# Patient Record
Sex: FEMALE | Race: WHITE
[De-identification: names, ages, dates, MRNs, and addresses within clinical notes are randomized per-mention and may not be internally consistent; named-entity substitution may affect disease eponyms.]

---

## 2019-02-28 ENCOUNTER — HOSPITAL ENCOUNTER (EMERGENCY)
Dept: HOSPITAL 96 - M.ERS | Age: 19
Discharge: HOME | End: 2019-02-28
Payer: COMMERCIAL

## 2019-02-28 VITALS — SYSTOLIC BLOOD PRESSURE: 111 MMHG | DIASTOLIC BLOOD PRESSURE: 66 MMHG

## 2019-02-28 VITALS — WEIGHT: 142.99 LBS | BODY MASS INDEX: 26.31 KG/M2 | HEIGHT: 62 IN

## 2019-02-28 DIAGNOSIS — J02.0: Primary | ICD-10-CM

## 2020-01-29 ENCOUNTER — HOSPITAL ENCOUNTER (INPATIENT)
Dept: HOSPITAL 96 - M.ERS | Age: 20
LOS: 1 days | Discharge: HOME | DRG: 897 | End: 2020-01-30
Attending: INTERNAL MEDICINE | Admitting: INTERNAL MEDICINE
Payer: COMMERCIAL

## 2020-01-29 VITALS — SYSTOLIC BLOOD PRESSURE: 86 MMHG | DIASTOLIC BLOOD PRESSURE: 35 MMHG

## 2020-01-29 VITALS — SYSTOLIC BLOOD PRESSURE: 101 MMHG | DIASTOLIC BLOOD PRESSURE: 61 MMHG

## 2020-01-29 VITALS — SYSTOLIC BLOOD PRESSURE: 104 MMHG | DIASTOLIC BLOOD PRESSURE: 63 MMHG

## 2020-01-29 VITALS — SYSTOLIC BLOOD PRESSURE: 83 MMHG | DIASTOLIC BLOOD PRESSURE: 30 MMHG

## 2020-01-29 VITALS — DIASTOLIC BLOOD PRESSURE: 42 MMHG | SYSTOLIC BLOOD PRESSURE: 92 MMHG

## 2020-01-29 VITALS — SYSTOLIC BLOOD PRESSURE: 102 MMHG | DIASTOLIC BLOOD PRESSURE: 33 MMHG

## 2020-01-29 VITALS — HEIGHT: 62 IN | BODY MASS INDEX: 23.37 KG/M2 | WEIGHT: 127 LBS

## 2020-01-29 VITALS — SYSTOLIC BLOOD PRESSURE: 101 MMHG | DIASTOLIC BLOOD PRESSURE: 75 MMHG

## 2020-01-29 VITALS — DIASTOLIC BLOOD PRESSURE: 54 MMHG | SYSTOLIC BLOOD PRESSURE: 90 MMHG

## 2020-01-29 VITALS — DIASTOLIC BLOOD PRESSURE: 45 MMHG | SYSTOLIC BLOOD PRESSURE: 96 MMHG

## 2020-01-29 VITALS — DIASTOLIC BLOOD PRESSURE: 80 MMHG | SYSTOLIC BLOOD PRESSURE: 133 MMHG

## 2020-01-29 VITALS — SYSTOLIC BLOOD PRESSURE: 97 MMHG | DIASTOLIC BLOOD PRESSURE: 50 MMHG

## 2020-01-29 VITALS — DIASTOLIC BLOOD PRESSURE: 36 MMHG | SYSTOLIC BLOOD PRESSURE: 86 MMHG

## 2020-01-29 VITALS — SYSTOLIC BLOOD PRESSURE: 95 MMHG | DIASTOLIC BLOOD PRESSURE: 48 MMHG

## 2020-01-29 VITALS — DIASTOLIC BLOOD PRESSURE: 46 MMHG | SYSTOLIC BLOOD PRESSURE: 107 MMHG

## 2020-01-29 VITALS — SYSTOLIC BLOOD PRESSURE: 107 MMHG | DIASTOLIC BLOOD PRESSURE: 64 MMHG

## 2020-01-29 VITALS — DIASTOLIC BLOOD PRESSURE: 56 MMHG | SYSTOLIC BLOOD PRESSURE: 83 MMHG

## 2020-01-29 DIAGNOSIS — F19.10: ICD-10-CM

## 2020-01-29 DIAGNOSIS — F10.230: Primary | ICD-10-CM

## 2020-01-29 DIAGNOSIS — Z79.899: ICD-10-CM

## 2020-01-29 DIAGNOSIS — Z28.21: ICD-10-CM

## 2020-01-29 DIAGNOSIS — F17.210: ICD-10-CM

## 2020-01-29 LAB
ABSOLUTE BASOPHILS: 0 THOU/UL (ref 0–0.2)
ABSOLUTE EOSINOPHILS: 0 THOU/UL (ref 0–0.7)
ABSOLUTE MONOCYTES: 0.5 THOU/UL (ref 0–1.2)
ALBUMIN SERPL-MCNC: 3.7 G/DL (ref 3.4–5)
ALP SERPL-CCNC: 85 U/L (ref 46–116)
ALT SERPL-CCNC: 34 U/L (ref 30–65)
ANION GAP SERPL CALC-SCNC: 10 MMOL/L (ref 7–16)
APAP SERPL-MCNC: < 2 UG/ML (ref 10–30)
AST SERPL-CCNC: 17 U/L (ref 15–37)
BASOPHILS NFR BLD AUTO: 0.3 %
BE: -4.8 MMOL/L
BILIRUB SERPL-MCNC: 0.4 MG/DL
BUN SERPL-MCNC: 6 MG/DL (ref 7–18)
CALCIUM SERPL-MCNC: 7.5 MG/DL (ref 8.5–10.1)
CHLORIDE SERPL-SCNC: 111 MMOL/L (ref 98–107)
CO2 SERPL-SCNC: 24 MMOL/L (ref 21–32)
CREAT SERPL-MCNC: 0.8 MG/DL (ref 0.6–1.3)
EOSINOPHIL NFR BLD: 0.4 %
ETHANOL SERPL-MCNC: 156 MG/DL (ref ?–10)
GLUCOSE SERPL-MCNC: 110 MG/DL (ref 70–99)
GRANULOCYTES NFR BLD MANUAL: 57.4 %
HCT VFR BLD CALC: 35.6 % (ref 37–47)
HGB BLD-MCNC: 12.2 GM/DL (ref 12–15)
LYMPHOCYTES # BLD: 2.5 THOU/UL (ref 0.8–5.3)
LYMPHOCYTES NFR BLD AUTO: 35.1 %
MCH RBC QN AUTO: 32.3 PG (ref 26–34)
MCHC RBC AUTO-ENTMCNC: 34.1 G/DL (ref 28–37)
MCV RBC: 94.7 FL (ref 80–100)
MONOCYTES NFR BLD: 6.8 %
MPV: 6.6 FL. (ref 7.2–11.1)
NEUTROPHILS # BLD: 4.1 THOU/UL (ref 1.6–8.1)
NUCLEATED RBCS: 0 /100WBC
PCO2 BLD: 36.7 MMHG (ref 35–45)
PLATELET COUNT*: 369 THOU/UL (ref 150–400)
PO2 BLD: 108.4 MMHG (ref 75–100)
POTASSIUM SERPL-SCNC: 3.2 MMOL/L (ref 3.5–5.1)
PROT SERPL-MCNC: 7 G/DL (ref 6.4–8.2)
RBC # BLD AUTO: 3.76 MIL/UL (ref 4.2–5)
RDW-CV: 13.6 % (ref 10.5–14.5)
SALICYLATES SERPL-MCNC: < 2.8 MG/DL (ref 2.8–20)
SODIUM SERPL-SCNC: 145 MMOL/L (ref 136–145)
WBC # BLD AUTO: 7.1 THOU/UL (ref 4–11)

## 2020-01-29 NOTE — EEG
[address]  [address]                    EEG STUDY REPORT              
_______________________________________________________________________________
 
Name:       COURT ATKINS HYACINTH               Room:           [address] [place]  
[facility]#:  [id number]      Account #:      [id number]  
Admission:  01/29/20     Attend Phys:    RAZIA Kwan
Discharge:  01/30/20     Date of Birth:  [birth year]  
         Report #: [id number]
                                                                     [id number]  
_______________________________________________________________________________
THIS REPORT FOR:  //name//                      
 
CC: EVAN physician/PCP
    Marcio Delcid
 
DATE OF SERVICE:  01/30/2020
 
 
This patient is admitted with an episode suggestive of seizure.
 
The patient's EEG was done by placing the electrodes by standard 10-20 system of
electrode placement.  Both referential and sequential montages were used for
recording.  Background activity in this patient's EEG is about 9 Hz and 30
microvolt.  Photic stimulation was unremarkable.  The patient went to sleep that
is associated with bilateral slowing and vertex sharp waves.  Throughout the
record, no active epileptiform activity was noticed.
 
IMPRESSION:  This patient's EEG is within normal limits.
 
Thank you very much for this referral.
 
 
 
 
 
 
 
 
 
 
 
 
 
 
 
 
 
 
 
 
 
 
 
 
                       
                                        By:                                
                 
D: 01/30/20 1705_______________________________________
T: 01/30/20 1908Ricky Mckenna MD            /nt

## 2020-01-29 NOTE — EKG
[place] [address]
Phone:  [phone]                     ELECTROCARDIOGRAM REPORT      
_______________________________________________________________________________
 
Name:       COURT ATKINS               Room:           [address]    ADM [place]  
[facility]#:  [id number]      Account #:      [id number]  
Admission:  20     Attend Phys:    RAZIA Kwan
Discharge:               Date of Birth:  [birth year]  
         Report #: [id number]
    [id number]
_______________________________________________________________________________
THIS REPORT FOR:  //name//                      
 
                         [facility] ED
                                       
Test Date:    2020               Test Time:    10:18:35
Pat Name:     COURT ATKINS           Department:   
Patient ID:   [id number]            Room:         [facility]
Gender:       F                        Technician:   AL
:          2000               Requested By: Toan Mars
Order Number: [id number] MD:   Morgan Mayfield
                                 Measurements
Intervals                              Axis          
Rate:         146                      P:            75
AL:           95                       QRS:          41
QRSD:         97                       T:            -63
QT:           337                                    
QTc:          526                                    
                           Interpretive Statements
Sinus tachycardia
RSR' in V1 or V2, right VCD or RVH
Borderline repol abnormality, diffuse leads
Prolonged QT interval
No previous ECG available for comparison
 
Electronically Signed On 2020 14:31:43 CST by Morgan Mayfield
[email or web address]
 
 
 
 
 
 
 
 
 
 
 
 
 
 
 
 
 
  <ELECTRONICALLY SIGNED>
                                           By: Morgan Mayfield MD, FAC      
  20     1431
D: 20 1018   _____________________________________
T: 20 1018   Morgan Mayfield MD, [facility]        /EPI

## 2020-01-29 NOTE — CON
[facility] 
[address] [place]  [address]                    CONSULTATION                  
_______________________________________________________________________________
 
Name:       CONCHAMONICAMARILEECOURT               Room:           [address] [place]  
.#:  [id number]      Account #:      [id number]  
Admission:  01/29/20     Attend Phys:    RAZIA Kwan
Discharge:               Date of Birth:  [birth year]  
         Report #: [id number]
                                                                     [id number]  
_______________________________________________________________________________
THIS REPORT FOR:  //name//                      
 
CC: EVAN physician/PCP
    Marcio Delcid
 
DATE OF SERVICE:  01/30/2020
 
 
HISTORY OF PRESENT ILLNESS:  This is a 19-year-old female patient who was
evaluated by me for the possibility of seizure.  The patient's records were
reviewed and I discussed the patient with Dr. Delcid.  Her history is poor and
it does not correlate with the history, which Emergency Room physician provided.
 She tells me that she does not use any marijuana, but she does the vaping.  Her
record indicates that she has used mushrooms and THC and she took at least 2
shots of alcohol.  She does not come up with history.  I did discuss with her
that she needs to be roberto about that.  Apparently, her heart was racing when
she came in.  She said she had seizure.  During those seizures she was able to
hear everybody, but thought her body was tight.  I do not know what to make out
of it.  She said she is feeling back to the baseline, but feels sore.  She
denies any prior history of seizure, but she said she does vaping pretty often.
 
REVIEW OF SYSTEMS:  Indicates she does not have a prior history of seizure.  Her
alcohol intake is not clear because she is getting a variable history to
everybody about alcohol intake.  She says she does not use cocaine and
amphetamines or any stimulant.  She did have a toxicology screen when she came
in and it was negative.  Her pregnancy test was also negative.  A 14-point
review of system was carried out and was otherwise unremarkable.
 
PAST MEDICAL HISTORY:  Negative for seizure.  She indicates she has been very
anxious.  She does not take any medication for her anxiety.
 
FAMILY HISTORY:  Negative for seizures.
 
SOCIAL HISTORY:  She drinks alcohol and she does vaping pretty often.
 
PHYSICAL EXAMINATION:
NEUROLOGIC:  Indicate she is alert, responsive, able to follow simple and
complex command.  She feels back to her baseline.  Cranial nerve examinations
appear unremarkable.  Neuromuscular examination is symmetrical.  There is no
meningeal sign.  There is no carotid bruit.  I could not look at the patient's
fundus.
CARDIAC:  Unremarkable.  
VITAL SIGNS:  Her blood pressure is 111/61, respirations 18, pulse is 68,
temperature is 96.9.
 
LABORATORY DATA:  White count is 7.1.  Potassium was trace low at 3.2.  
 
 
 
[place] [address]                    CONSULTATION                  
_______________________________________________________________________________
 
Name:       COURT ATKINS               Room:           [address] [place]  
[facility].#:  [id number]      Account #:      [id number]  
Admission:  01/29/20     Attend Phys:    RAZIA Kwan
Discharge:               Date of Birth:  [birth year]  
         Report #: [id number]
                                                                     [id number]  
_______________________________________________________________________________
 
She did have a CT scan of the head and a chest x-ray and that showed a question
of artifact versus abnormality.
 
IMPRESSION:  Seizure-like activity with a question of abnormality versus
artifact on CT.  Her seizure may be induced by the drugs and alcohol because the
history is poor and is not certain that it was even epileptic seizure.
 
I discussed the situation with the patient.  We will go ahead and get an MRI and
EEG done.  I discussed further workup with her and about anticonvulsant.  After
discussing all the options with the patient, she decided that she will go ahead
with the MRI and EEG.  If that is negative, she will take seizure precautions,
but will not go on any anticonvulsant.  Seizure precautions were discussed with
her and the fact that she cannot drive for 6 months because of possible seizure
was discussed with her.  She must stop using any alcohol and she must stop her
vaping.  This is because of increasing amount of neurological complications has
been associated with vaping.
 
Thank you very much for this referral and we will arrange this test.
 
 
 
 
 
 
 
 
 
 
 
 
 
 
 
 
 
 
 
 
 
 
 
 
 
                       
                                        By:                                
                 
D: 01/30/20 1030_______________________________________
T: 01/30/20 1107Ricky Mckenna MD            /nt

## 2020-01-30 VITALS — SYSTOLIC BLOOD PRESSURE: 111 MMHG | DIASTOLIC BLOOD PRESSURE: 61 MMHG

## 2020-01-30 VITALS — DIASTOLIC BLOOD PRESSURE: 71 MMHG | SYSTOLIC BLOOD PRESSURE: 119 MMHG

## 2020-01-30 VITALS — SYSTOLIC BLOOD PRESSURE: 106 MMHG | DIASTOLIC BLOOD PRESSURE: 58 MMHG

## 2020-01-30 VITALS — SYSTOLIC BLOOD PRESSURE: 104 MMHG | DIASTOLIC BLOOD PRESSURE: 51 MMHG

## 2020-01-30 VITALS — SYSTOLIC BLOOD PRESSURE: 119 MMHG | DIASTOLIC BLOOD PRESSURE: 71 MMHG

## 2020-01-30 VITALS — SYSTOLIC BLOOD PRESSURE: 105 MMHG | DIASTOLIC BLOOD PRESSURE: 57 MMHG

## 2020-01-30 VITALS — DIASTOLIC BLOOD PRESSURE: 51 MMHG | SYSTOLIC BLOOD PRESSURE: 102 MMHG

## 2020-01-30 VITALS — SYSTOLIC BLOOD PRESSURE: 104 MMHG | DIASTOLIC BLOOD PRESSURE: 56 MMHG

## 2020-01-30 VITALS — SYSTOLIC BLOOD PRESSURE: 98 MMHG | DIASTOLIC BLOOD PRESSURE: 44 MMHG

## 2020-01-30 VITALS — SYSTOLIC BLOOD PRESSURE: 99 MMHG | DIASTOLIC BLOOD PRESSURE: 51 MMHG

## 2020-01-30 LAB — MAGNESIUM SERPL-MCNC: 1.6 MG/DL (ref 1.8–2.4)

## 2020-06-21 ENCOUNTER — HOSPITAL ENCOUNTER (EMERGENCY)
Dept: HOSPITAL 96 - M.ERS | Age: 20
Discharge: HOME | End: 2020-06-21
Payer: COMMERCIAL

## 2020-06-21 VITALS — DIASTOLIC BLOOD PRESSURE: 70 MMHG | SYSTOLIC BLOOD PRESSURE: 125 MMHG

## 2020-06-21 VITALS — HEIGHT: 62 IN | BODY MASS INDEX: 22.82 KG/M2 | WEIGHT: 124.01 LBS

## 2020-06-21 DIAGNOSIS — R60.0: ICD-10-CM

## 2020-06-21 DIAGNOSIS — M79.10: Primary | ICD-10-CM

## 2020-06-21 DIAGNOSIS — M54.2: ICD-10-CM

## 2020-06-21 DIAGNOSIS — Z79.899: ICD-10-CM

## 2020-08-29 ENCOUNTER — HOSPITAL ENCOUNTER (EMERGENCY)
Dept: HOSPITAL 96 - M.ERS | Age: 20
LOS: 1 days | Discharge: HOME | End: 2020-08-30
Payer: COMMERCIAL

## 2020-08-29 VITALS — HEIGHT: 62 IN | WEIGHT: 130.01 LBS | BODY MASS INDEX: 23.92 KG/M2

## 2020-08-29 DIAGNOSIS — N76.0: Primary | ICD-10-CM

## 2020-08-29 LAB
BILIRUB UR-MCNC: NEGATIVE MG/DL
COLOR UR: YELLOW
KETONES UR STRIP-MCNC: NEGATIVE MG/DL
PROT UR QL STRIP: NEGATIVE
RBC # UR STRIP: NEGATIVE /UL
SP GR UR STRIP: >= 1.03 (ref 1–1.03)
SQUAMOUS: (no result) /LPF (ref 0–3)
URINE CLARITY: CLEAR
URINE GLUCOSE-RANDOM: NEGATIVE
URINE LEUKOCYTES-REFLEX: (no result)
URINE NITRITE-REFLEX: NEGATIVE
URINE WBC-REFLEX: (no result) /HPF (ref 0–5)
UROBILINOGEN UR STRIP-ACNC: 0.2 E.U./DL (ref 0.2–1)

## 2020-08-30 VITALS — SYSTOLIC BLOOD PRESSURE: 118 MMHG | DIASTOLIC BLOOD PRESSURE: 70 MMHG
